# Patient Record
Sex: MALE | Race: WHITE | HISPANIC OR LATINO | Employment: UNEMPLOYED | URBAN - METROPOLITAN AREA
[De-identification: names, ages, dates, MRNs, and addresses within clinical notes are randomized per-mention and may not be internally consistent; named-entity substitution may affect disease eponyms.]

---

## 2019-11-11 ENCOUNTER — HOSPITAL ENCOUNTER (INPATIENT)
Facility: HOSPITAL | Age: 29
LOS: 1 days | Discharge: HOME | DRG: 918 | End: 2019-11-12
Attending: SURGERY | Admitting: FAMILY MEDICINE

## 2019-11-11 DIAGNOSIS — T59.811A: ICD-10-CM

## 2019-11-11 DIAGNOSIS — R06.02 SOB (SHORTNESS OF BREATH): ICD-10-CM

## 2019-11-11 DIAGNOSIS — R55: ICD-10-CM
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
## 2019-11-11 PROBLEM — T58.92XA: Status: ACTIVE | Noted: 2019-11-11

## 2019-11-11 PROBLEM — T58.92XA: Status: RESOLVED | Noted: 2019-11-11 | Resolved: 2019-11-11

## 2019-11-11 PROBLEM — G93.1 HYPOXIC ENCEPHALOPATHY: Status: ACTIVE | Noted: 2019-11-11

## 2019-11-11 PROBLEM — T58.91XA TOXIC EFFECT OF CARBON MONOXIDE, UNINTENTIONAL: Status: ACTIVE | Noted: 2019-11-11

## 2019-11-11 LAB
ALBUMIN SERPL BCP-MCNC: 4.4 G/DL (ref 3.5–5.2)
ALLENS TEST: ABNORMAL
ALP SERPL-CCNC: 97 U/L (ref 55–135)
ALT SERPL W/O P-5'-P-CCNC: 5 U/L (ref 10–44)
AMPHET+METHAMPHET UR QL: NEGATIVE
ANION GAP SERPL CALC-SCNC: 17 MMOL/L (ref 8–16)
AST SERPL-CCNC: 16 U/L (ref 10–40)
BACTERIA #/AREA URNS HPF: ABNORMAL /HPF
BARBITURATES UR QL SCN>200 NG/ML: NEGATIVE
BASOPHILS # BLD AUTO: 0.04 K/UL (ref 0–0.2)
BASOPHILS NFR BLD: 0.2 % (ref 0–1.9)
BENZODIAZ UR QL SCN>200 NG/ML: NEGATIVE
BILIRUB SERPL-MCNC: 0.4 MG/DL (ref 0.1–1)
BILIRUB UR QL STRIP: NEGATIVE
BNP SERPL-MCNC: <10 PG/ML (ref 0–99)
BUN SERPL-MCNC: 15 MG/DL (ref 6–20)
BZE UR QL SCN: NEGATIVE
CALCIUM SERPL-MCNC: 9.5 MG/DL (ref 8.7–10.5)
CANNABINOIDS UR QL SCN: NORMAL
CHLORIDE SERPL-SCNC: 104 MMOL/L (ref 95–110)
CK MB SERPL-MCNC: 1.5 NG/ML (ref 0.1–6.5)
CK MB SERPL-RTO: 1.4 % (ref 0–5)
CK SERPL-CCNC: 105 U/L (ref 20–200)
CK SERPL-CCNC: 105 U/L (ref 20–200)
CLARITY UR: CLEAR
CO2 SERPL-SCNC: 20 MMOL/L (ref 23–29)
COLOR UR: YELLOW
CREAT SERPL-MCNC: 1.3 MG/DL (ref 0.5–1.4)
CREAT UR-MCNC: 175.9 MG/DL (ref 23–375)
DELSYS: ABNORMAL
DIFFERENTIAL METHOD: ABNORMAL
EOSINOPHIL # BLD AUTO: 0 K/UL (ref 0–0.5)
EOSINOPHIL NFR BLD: 0.1 % (ref 0–8)
ERYTHROCYTE [DISTWIDTH] IN BLOOD BY AUTOMATED COUNT: 13.5 % (ref 11.5–14.5)
EST. GFR  (AFRICAN AMERICAN): >60 ML/MIN/1.73 M^2
EST. GFR  (NON AFRICAN AMERICAN): >60 ML/MIN/1.73 M^2
GLUCOSE SERPL-MCNC: 159 MG/DL (ref 70–110)
GLUCOSE UR QL STRIP: NEGATIVE
HCO3 UR-SCNC: 17 MMOL/L (ref 22–26)
HCT VFR BLD AUTO: 51.6 % (ref 40–54)
HGB BLD-MCNC: 17.1 G/DL (ref 14–18)
HGB UR QL STRIP: NEGATIVE
HYALINE CASTS #/AREA URNS LPF: 5 /LPF
IMM GRANULOCYTES # BLD AUTO: 0.08 K/UL (ref 0–0.04)
IMM GRANULOCYTES NFR BLD AUTO: 0.5 % (ref 0–0.5)
KETONES UR QL STRIP: ABNORMAL
LEUKOCYTE ESTERASE UR QL STRIP: NEGATIVE
LYMPHOCYTES # BLD AUTO: 1.1 K/UL (ref 1–4.8)
LYMPHOCYTES NFR BLD: 6.8 % (ref 18–48)
MCH RBC QN AUTO: 27.4 PG (ref 27–31)
MCHC RBC AUTO-ENTMCNC: 33.1 G/DL (ref 32–36)
MCV RBC AUTO: 83 FL (ref 82–98)
METHADONE UR QL SCN>300 NG/ML: NEGATIVE
MICROSCOPIC COMMENT: ABNORMAL
MONOCYTES # BLD AUTO: 0.9 K/UL (ref 0.3–1)
MONOCYTES NFR BLD: 5.4 % (ref 4–15)
NEUTROPHILS # BLD AUTO: 14.3 K/UL (ref 1.8–7.7)
NEUTROPHILS NFR BLD: 87 % (ref 38–73)
NITRITE UR QL STRIP: NEGATIVE
NRBC BLD-RTO: 0 /100 WBC
OPIATES UR QL SCN: NEGATIVE
OTHER ELEMENTS URNS MICRO: ABNORMAL
PCO2 BLDA: 33 MMHG (ref 35–45)
PCP UR QL SCN>25 NG/ML: NEGATIVE
PH SMN: 7.32 [PH] (ref 7.35–7.45)
PH UR STRIP: 6 [PH] (ref 5–8)
PLATELET # BLD AUTO: 297 K/UL (ref 150–350)
PMV BLD AUTO: 9 FL (ref 9.2–12.9)
PO2 BLDA: 379 MMHG (ref 75–100)
POC BE: -7.9 MMOL/L (ref -2–2)
POC COHB: 34.4 % (ref 0–3)
POC METHB: 0.4 % (ref 0–1.5)
POC O2HB ARTERIAL: 63.9 % (ref 94–100)
POC SATURATED O2: 98.2 % (ref 90–100)
POC TCO2: 18 MMOL/L
POC THB: 16.9 G/DL (ref 12–18)
POTASSIUM SERPL-SCNC: 4.5 MMOL/L (ref 3.5–5.1)
PROT SERPL-MCNC: 7.7 G/DL (ref 6–8.4)
PROT UR QL STRIP: ABNORMAL
RBC # BLD AUTO: 6.23 M/UL (ref 4.6–6.2)
RBC #/AREA URNS HPF: 4 /HPF (ref 0–4)
SITE: ABNORMAL
SODIUM SERPL-SCNC: 141 MMOL/L (ref 136–145)
SP GR UR STRIP: 1.02 (ref 1–1.03)
TOXICOLOGY INFORMATION: NORMAL
TROPONIN I SERPL DL<=0.01 NG/ML-MCNC: 0.03 NG/ML (ref 0–0.03)
URN SPEC COLLECT METH UR: ABNORMAL
UROBILINOGEN UR STRIP-ACNC: ABNORMAL EU/DL
WBC # BLD AUTO: 16.42 K/UL (ref 3.9–12.7)
WBC #/AREA URNS HPF: 3 /HPF (ref 0–5)

## 2019-11-11 PROCEDURE — 80053 COMPREHEN METABOLIC PANEL: CPT

## 2019-11-11 PROCEDURE — 80307 DRUG TEST PRSMV CHEM ANLYZR: CPT

## 2019-11-11 PROCEDURE — 94761 N-INVAS EAR/PLS OXIMETRY MLT: CPT

## 2019-11-11 PROCEDURE — 82550 ASSAY OF CK (CPK): CPT

## 2019-11-11 PROCEDURE — 93005 ELECTROCARDIOGRAM TRACING: CPT

## 2019-11-11 PROCEDURE — 25000242 PHARM REV CODE 250 ALT 637 W/ HCPCS: Performed by: SURGERY

## 2019-11-11 PROCEDURE — 82803 BLOOD GASES ANY COMBINATION: CPT | Performed by: SURGERY

## 2019-11-11 PROCEDURE — 81000 URINALYSIS NONAUTO W/SCOPE: CPT | Mod: 59

## 2019-11-11 PROCEDURE — 94640 AIRWAY INHALATION TREATMENT: CPT

## 2019-11-11 PROCEDURE — 36600 WITHDRAWAL OF ARTERIAL BLOOD: CPT

## 2019-11-11 PROCEDURE — 63600175 PHARM REV CODE 636 W HCPCS: Performed by: SURGERY

## 2019-11-11 PROCEDURE — 93010 EKG 12-LEAD: ICD-10-PCS | Mod: ,,, | Performed by: INTERNAL MEDICINE

## 2019-11-11 PROCEDURE — 99222 1ST HOSP IP/OBS MODERATE 55: CPT | Mod: ,,, | Performed by: FAMILY MEDICINE

## 2019-11-11 PROCEDURE — 11000001 HC ACUTE MED/SURG PRIVATE ROOM

## 2019-11-11 PROCEDURE — 99222 PR INITIAL HOSPITAL CARE,LEVL II: ICD-10-PCS | Mod: ,,, | Performed by: FAMILY MEDICINE

## 2019-11-11 PROCEDURE — 27000221 HC OXYGEN, UP TO 24 HOURS

## 2019-11-11 PROCEDURE — 99900035 HC TECH TIME PER 15 MIN (STAT)

## 2019-11-11 PROCEDURE — 96360 HYDRATION IV INFUSION INIT: CPT

## 2019-11-11 PROCEDURE — 83880 ASSAY OF NATRIURETIC PEPTIDE: CPT

## 2019-11-11 PROCEDURE — 85025 COMPLETE CBC W/AUTO DIFF WBC: CPT

## 2019-11-11 PROCEDURE — 82553 CREATINE MB FRACTION: CPT

## 2019-11-11 PROCEDURE — 99285 EMERGENCY DEPT VISIT HI MDM: CPT | Mod: 25

## 2019-11-11 PROCEDURE — 84484 ASSAY OF TROPONIN QUANT: CPT

## 2019-11-11 PROCEDURE — 93010 ELECTROCARDIOGRAM REPORT: CPT | Mod: ,,, | Performed by: INTERNAL MEDICINE

## 2019-11-11 PROCEDURE — 36415 COLL VENOUS BLD VENIPUNCTURE: CPT

## 2019-11-11 RX ORDER — SODIUM CHLORIDE 0.9 % (FLUSH) 0.9 %
10 SYRINGE (ML) INJECTION
Status: DISCONTINUED | OUTPATIENT
Start: 2019-11-11 | End: 2019-11-12 | Stop reason: HOSPADM

## 2019-11-11 RX ORDER — PANTOPRAZOLE SODIUM 40 MG/1
40 TABLET, DELAYED RELEASE ORAL DAILY
Status: DISCONTINUED | OUTPATIENT
Start: 2019-11-12 | End: 2019-11-12 | Stop reason: HOSPADM

## 2019-11-11 RX ORDER — LEVALBUTEROL 1.25 MG/.5ML
1.25 SOLUTION, CONCENTRATE RESPIRATORY (INHALATION) EVERY 6 HOURS PRN
Status: DISCONTINUED | OUTPATIENT
Start: 2019-11-11 | End: 2019-11-12 | Stop reason: HOSPADM

## 2019-11-11 RX ORDER — IPRATROPIUM BROMIDE AND ALBUTEROL SULFATE 2.5; .5 MG/3ML; MG/3ML
3 SOLUTION RESPIRATORY (INHALATION)
Status: COMPLETED | OUTPATIENT
Start: 2019-11-11 | End: 2019-11-11

## 2019-11-11 RX ORDER — SODIUM CHLORIDE 9 MG/ML
1000 INJECTION, SOLUTION INTRAVENOUS
Status: COMPLETED | OUTPATIENT
Start: 2019-11-11 | End: 2019-11-11

## 2019-11-11 RX ORDER — ACETAMINOPHEN 325 MG/1
650 TABLET ORAL EVERY 8 HOURS PRN
Status: DISCONTINUED | OUTPATIENT
Start: 2019-11-11 | End: 2019-11-12 | Stop reason: HOSPADM

## 2019-11-11 RX ORDER — ONDANSETRON 2 MG/ML
4 INJECTION INTRAMUSCULAR; INTRAVENOUS EVERY 8 HOURS PRN
Status: DISCONTINUED | OUTPATIENT
Start: 2019-11-11 | End: 2019-11-12 | Stop reason: HOSPADM

## 2019-11-11 RX ADMIN — SODIUM CHLORIDE 1000 ML: 0.9 INJECTION, SOLUTION INTRAVENOUS at 06:11

## 2019-11-11 RX ADMIN — IPRATROPIUM BROMIDE AND ALBUTEROL SULFATE 3 ML: .5; 3 SOLUTION RESPIRATORY (INHALATION) at 05:11
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
## 2019-11-12 VITALS
WEIGHT: 145 LBS | OXYGEN SATURATION: 100 % | DIASTOLIC BLOOD PRESSURE: 68 MMHG | BODY MASS INDEX: 23.4 KG/M2 | RESPIRATION RATE: 18 BRPM | TEMPERATURE: 98 F | HEART RATE: 76 BPM | SYSTOLIC BLOOD PRESSURE: 103 MMHG

## 2019-11-12 LAB
ALBUMIN SERPL BCP-MCNC: 3.6 G/DL (ref 3.5–5.2)
ALLENS TEST: ABNORMAL
ALP SERPL-CCNC: 77 U/L (ref 55–135)
ALT SERPL W/O P-5'-P-CCNC: <5 U/L (ref 10–44)
ANION GAP SERPL CALC-SCNC: 8 MMOL/L (ref 8–16)
AST SERPL-CCNC: 14 U/L (ref 10–40)
BASOPHILS # BLD AUTO: 0.04 K/UL (ref 0–0.2)
BASOPHILS NFR BLD: 0.5 % (ref 0–1.9)
BILIRUB SERPL-MCNC: 0.5 MG/DL (ref 0.1–1)
BUN SERPL-MCNC: 14 MG/DL (ref 6–20)
CALCIUM SERPL-MCNC: 8.6 MG/DL (ref 8.7–10.5)
CHLORIDE SERPL-SCNC: 106 MMOL/L (ref 95–110)
CO2 SERPL-SCNC: 26 MMOL/L (ref 23–29)
CREAT SERPL-MCNC: 0.8 MG/DL (ref 0.5–1.4)
DELSYS: ABNORMAL
DIFFERENTIAL METHOD: ABNORMAL
EOSINOPHIL # BLD AUTO: 0.1 K/UL (ref 0–0.5)
EOSINOPHIL NFR BLD: 0.8 % (ref 0–8)
ERYTHROCYTE [DISTWIDTH] IN BLOOD BY AUTOMATED COUNT: 13.4 % (ref 11.5–14.5)
EST. GFR  (AFRICAN AMERICAN): >60 ML/MIN/1.73 M^2
EST. GFR  (NON AFRICAN AMERICAN): >60 ML/MIN/1.73 M^2
GLUCOSE SERPL-MCNC: 81 MG/DL (ref 70–110)
HCO3 UR-SCNC: 26.6 MMOL/L (ref 22–26)
HCT VFR BLD AUTO: 43.5 % (ref 40–54)
HGB BLD-MCNC: 14.1 G/DL (ref 14–18)
IMM GRANULOCYTES # BLD AUTO: 0.02 K/UL (ref 0–0.04)
IMM GRANULOCYTES NFR BLD AUTO: 0.3 % (ref 0–0.5)
LYMPHOCYTES # BLD AUTO: 2.4 K/UL (ref 1–4.8)
LYMPHOCYTES NFR BLD: 32.5 % (ref 18–48)
MCH RBC QN AUTO: 26.7 PG (ref 27–31)
MCHC RBC AUTO-ENTMCNC: 32.4 G/DL (ref 32–36)
MCV RBC AUTO: 82 FL (ref 82–98)
MONOCYTES # BLD AUTO: 0.7 K/UL (ref 0.3–1)
MONOCYTES NFR BLD: 9.2 % (ref 4–15)
NEUTROPHILS # BLD AUTO: 4.1 K/UL (ref 1.8–7.7)
NEUTROPHILS NFR BLD: 56.7 % (ref 38–73)
NRBC BLD-RTO: 0 /100 WBC
PCO2 BLDA: 42 MMHG (ref 35–45)
PH SMN: 7.41 [PH] (ref 7.35–7.45)
PLATELET # BLD AUTO: 225 K/UL (ref 150–350)
PMV BLD AUTO: 9.5 FL (ref 9.2–12.9)
PO2 BLDA: 516 MMHG (ref 75–100)
POC BE: 1.7 MMOL/L (ref -2–2)
POC COHB: 0.9 % (ref 0–3)
POC METHB: 1.6 % (ref 0–1.5)
POC O2HB ARTERIAL: 96.2 % (ref 94–100)
POC SATURATED O2: 98.7 % (ref 90–100)
POC TCO2: 27.9 MMOL/L
POC THB: 14.7 G/DL (ref 12–18)
POTASSIUM SERPL-SCNC: 3.5 MMOL/L (ref 3.5–5.1)
PROT SERPL-MCNC: 6.3 G/DL (ref 6–8.4)
RBC # BLD AUTO: 5.29 M/UL (ref 4.6–6.2)
SITE: ABNORMAL
SODIUM SERPL-SCNC: 140 MMOL/L (ref 136–145)
WBC # BLD AUTO: 7.3 K/UL (ref 3.9–12.7)

## 2019-11-12 PROCEDURE — 27100171 HC OXYGEN HIGH FLOW UP TO 24 HOURS

## 2019-11-12 PROCEDURE — 94761 N-INVAS EAR/PLS OXIMETRY MLT: CPT

## 2019-11-12 PROCEDURE — 99232 SBSQ HOSP IP/OBS MODERATE 35: CPT | Mod: ,,, | Performed by: INTERNAL MEDICINE

## 2019-11-12 PROCEDURE — 82803 BLOOD GASES ANY COMBINATION: CPT | Performed by: INTERNAL MEDICINE

## 2019-11-12 PROCEDURE — 36415 COLL VENOUS BLD VENIPUNCTURE: CPT

## 2019-11-12 PROCEDURE — 27000221 HC OXYGEN, UP TO 24 HOURS

## 2019-11-12 PROCEDURE — 99900035 HC TECH TIME PER 15 MIN (STAT)

## 2019-11-12 PROCEDURE — 36600 WITHDRAWAL OF ARTERIAL BLOOD: CPT

## 2019-11-12 PROCEDURE — 99232 PR SUBSEQUENT HOSPITAL CARE,LEVL II: ICD-10-PCS | Mod: ,,, | Performed by: INTERNAL MEDICINE

## 2019-11-12 PROCEDURE — 85025 COMPLETE CBC W/AUTO DIFF WBC: CPT

## 2019-11-12 PROCEDURE — 80053 COMPREHEN METABOLIC PANEL: CPT

## 2019-11-12 NOTE — ASSESSMENT & PLAN NOTE
Treated with 100% O2 overnight  Repeat ABG shows resolution  Discussed No more generator in home

## 2019-11-12 NOTE — PLAN OF CARE
11/12/19 1131   Discharge Assessment   Assessment Type Discharge Planning Assessment   Confirmed/corrected address and phone number on facesheet? No   Assessment information obtained from? Patient   Prior to hospitilization cognitive status: Alert/Oriented   Prior to hospitalization functional status: Independent   Current cognitive status: Alert/Oriented   Current Functional Status: Independent   Facility Arrived From: Home   Lives With alone   Able to Return to Prior Arrangements yes   Is patient able to care for self after discharge? Yes   Who are your caregiver(s) and their phone number(s)? Austen (Brother) [phone]   Patient's perception of discharge disposition home or selfcare   Readmission Within the Last 30 Days no previous admission in last 30 days   Patient currently being followed by outpatient case management? No   Patient currently receives any other outside agency services? No   Equipment Currently Used at Home none   Does the patient have transportation home? Yes   Transportation Anticipated family or friend will provide  (Brother will provide. )   Discharge Plan A Home   Discharge Plan B Home with family   DME Needed Upon Discharge  none   Patient/Family in Agreement with Plan yes       No post-acute care needs identified at this time. SW to continue to monitor needs throughout hospital stay.     Twyla Miles, MARTHA
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
## 2019-11-12 NOTE — PLAN OF CARE
11/12/19 1136   Post-Acute Status   Post-Acute Authorization Other   Other Status No Post-Acute Service Needs   Discharge Delays None known at this time

## 2019-11-12 NOTE — PROGRESS NOTES
Staff Handoff  Patient transferred from ED via wheelchair with continuous oxygen via a nonrebreather mask. Transported by CONNIE Valerio. Patient speaks broken english, will use sergo for assessment and admit questions. No complaints of pain. No s/s   of respiratory distress. Asked to call for assistance. Bed alarm on. Call light within reach.       Resident Handoff

## 2019-11-12 NOTE — PLAN OF CARE
11/12/19 1135   Final Note   Assessment Type Final Discharge Note   Anticipated Discharge Disposition Home   What phone number can be called within the next 1-3 days to see how you are doing after discharge? [phone]   Hospital Follow Up  Appt(s) scheduled? Yes   Discharge plans and expectations educations in teach back method with documentation complete? Yes         No post-acute care needs identified during this hospital stay.     Twyla Miles LMSW
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
## 2019-11-12 NOTE — DISCHARGE INSTRUCTIONS
Carbon Monoxide Poisoning     Oxygen is given through a facemask or in a special oxygen chamber.   Carbon monoxide (CO) is a deadly gas you can't see or smell. It's produced when certain fuels are burned. Faulty stoves or furnaces can release carbon monoxide. So can a car left running in an enclosed space. Inhaling even small amounts of carbon monoxide can make you sick. Larger amounts can be fatal.  When to go to the emergency room (ER)  Carbon monoxide (CO) poisoning is a medical emergency. Get the victim into fresh air right away. Then call 911 or go to the nearest hospital. It`s easy to mistake mild carbon monoxide poisoning for the flu. If more than one person is having the same symptoms, or symptoms occur at the same location (such as home, or work) it should raise concern for CO poisoning. Watch for symptoms such as:  · Headache and dizziness  · Weakness  · Confusion or loss of memory  · Nausea and vomiting  · Trouble breathing or chest pain  · Irregular, skipped, or fast heartbeats  What to expect in the ER  · Oxygen therapy is the main treatment for carbon monoxide poisoning. The victim is likely to receive oxygen through a facemask right away.  · The victim may be placed on a heart monitor with a pulse oximeter. This displays the heart rhythm and the amount of oxygen in the blood. An electrocardiogram may be done to check for damage to the heart.    · Blood tests to check carbon monoxide and oxygen levels and lung function may be done.  · In certain cases, the victim might be transferred to a hyperbaric oxygen center for treatment in a special oxygen chamber.   · An imaging test such as computed tomography (CT) or magnetic resonance imaging (MRI) may be done to check for damage to the brain. Depending on the test results, the victim may be admitted to the hospital.  To help prevent carbon monoxide poisoning  · Have furnaces, water heaters, gas ovens, and wood stoves checked each year.  · Don't use a  charcoal grill inside the house.  · Don't leave your car running in an enclosed space.  · Install a carbon monoxide detector that meets [employer] (UL) standards.   Date Last Reviewed: 7/15/2015  © 4292-6069 [employer]. [address] [place] [address], [place] [address]. All rights reserved. This information is not intended as a substitute for professional medical care. Always follow your healthcare professional's instructions.        Envenenamiento por monóxido de carbono     El oxígeno se administra a través de amita máscara o en amita cámara de oxígeno especial.   El monóxido de carbono es un gas mortal que no se puede carlos ni oler. Se produce cuando se queman ciertos combustibles. Las estufas u hornos defectuosos pueden liberar monóxido de carbono. Lo mismo sucede si se kathy un automóvil en marcha en un espacio cerrado. Inhalar incluso cantidades pequeñas de monóxido de carbono puede hacer que se sienta mal. Y las cantidades grandes pueden ser fatales.  ¿Cuándo debe acudir a la emiliana de emergencias (ER, por ace siglas en inglés)?  El envenenamiento por monóxido de carbono es amita emergencia médica. Lleve a la víctima hacia el aire john de inmediato. Luego, llame al 911 o vaya al hospital más [facility]. Es muy fácil confundir el envenenamiento por monóxido de carbono con amita gripe. Preste atención a síntomas tales rishabh:  · Dolor de lai o mareos  · Debilidad  · Confusión o pérdida de la memoria  · Náuseas y vómito  · Dificultad para respirar o dolor en el pecho  · Latidos irregulares o rápidos, o el corazón se saltea latidos  Para evitar el envenenamiento por monóxido de carbono  · Renee revisar hornos, hornallas, calentadores de agua, estufas de lindsey y de gas todos los años.  · No use el parrillero dentro de la casa.  · No deje vo automóvil en marcha en un espacio cerrado.  · Coloque un detector de monóxido de carbono que cumpla las normas de [employer] (UL).   ¿Qué puede  esperar en la ER?  · La oxigenoterapia es el mejor tratamiento para el envenenamiento por monóxido de carbono. Es probable que a la víctima le administren oxígeno de inmediato a través de amita máscara facial.  · Puede que la víctima sea colocada en un monitor cardíaco con un oxímetro de pulso. [place] permite carlos el ritmo cardíaco y la cantidad de oxígeno en la jesse. Quizás le phyllis un electrocardiograma para carlos si hay daños en el corazón.    · Es posible que le phyllis análisis de jesse para carlos los niveles de oxígeno y el funcionamiento de los pulmones.  · Puede que la víctima sea trasladada a un centro de oxígeno hiperbárico para recibir tratamiento en amita cámara de oxígeno especial.   · También puede que le phyllis amita prueba de diagnóstico por imágenes llamada tomografía computarizada (CT, por ace siglas en inglés) o amita resonancia magnética (MRI, por ace siglas en inglés) para carlos si hay daños en el cerebro. Según los resultados de las pruebas, la víctima quizás deba ser admitida en el hospital.  Date Last Reviewed: 7/15/2015  © 3080-3594 [employer]. [address] [place] [address], [place] [address]. Todos los derechos reservados. Esta información no pretende sustituir la atención médica profesional. Sólo vo médico puede diagnosticar y tratar un problema de navi.
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
## 2019-11-12 NOTE — SUBJECTIVE & OBJECTIVE
Review of Systems   Constitutional: Negative for activity change and appetite change.   HENT: Negative for congestion, ear pain, sneezing and sore throat.    Eyes: Negative for redness and visual disturbance.   Respiratory: Negative for cough, chest tightness and stridor.    Cardiovascular: Negative for chest pain.   Gastrointestinal: Negative for abdominal pain, blood in stool, diarrhea, nausea and vomiting.   Genitourinary: Negative for difficulty urinating, dysuria and hematuria.   Musculoskeletal: Negative for arthralgias, back pain, joint swelling, myalgias and neck pain.   Skin: Negative for rash.   Neurological: Negative for dizziness, weakness and headaches.   Psychiatric/Behavioral: Negative for agitation. The patient is not nervous/anxious.      Objective:     Vital Signs (Most Recent):  Temp: 96.2 °F (35.7 °C) (11/12/19 0722)  Pulse: 64 (11/12/19 1000)  Resp: 18 (11/12/19 0722)  BP: (!) 102/54 (11/12/19 0722)  SpO2: 100 % (11/12/19 0722) Vital Signs (24h Range):  Temp:  [96.1 °F (35.6 °C)-97 °F (36.1 °C)] 96.2 °F (35.7 °C)  Pulse:  [] 64  Resp:  [17-25] 18  SpO2:  [99 %-100 %] 100 %  BP: (102-121)/(54-69) 102/54     Weight: 65.8 kg (145 lb)  Body mass index is 23.4 kg/m².    Physical Exam   Constitutional: He is oriented to person, place, and time. He appears well-developed and well-nourished.   HENT:   Head: Normocephalic and atraumatic.   Right Ear: External ear normal.   Left Ear: External ear normal.   Eyes: Pupils are equal, round, and reactive to light. Conjunctivae and EOM are normal.   Neck: Normal range of motion. Neck supple. No tracheal deviation present.   Cardiovascular: Normal rate and regular rhythm.   No murmur heard.  Pulmonary/Chest: Effort normal. No respiratory distress. He has no wheezes.   Abdominal: Soft. Bowel sounds are normal. He exhibits no distension. There is no tenderness.   Musculoskeletal: Normal range of motion. He exhibits no edema or tenderness.   Neurological: He  is alert and oriented to person, place, and time. He has normal reflexes. No cranial nerve deficit.   Skin: Skin is warm and dry.   Psychiatric: He has a normal mood and affect. Judgment and thought content normal.   Nursing note and vitals reviewed.        CRANIAL NERVES     CN III, IV, VI   Pupils are equal, round, and reactive to light.  Extraocular motions are normal.        Significant Labs:   ABGs:   Recent Labs   Lab 11/12/19  0637   PH 7.41   PCO2 42   HCO3 26.60*   POCSATURATED 98.7   BE 1.70   TOTALHB 14.7   COHB 0.9   METHB 1.6*     CBC:   Recent Labs   Lab 11/11/19 1739 11/12/19  0533   WBC 16.42* 7.30   HGB 17.1 14.1   HCT 51.6 43.5    225     CMP:   Recent Labs   Lab 11/11/19 1739 11/12/19  0532    140   K 4.5 3.5    106   CO2 20* 26   * 81   BUN 15 14   CREATININE 1.3 0.8   CALCIUM 9.5 8.6*   PROT 7.7 6.3   ALBUMIN 4.4 3.6   BILITOT 0.4 0.5   ALKPHOS 97 77   AST 16 14   ALT 5* <5*   ANIONGAP 17* 8   EGFRNONAA >60 >60   UDS + THC  Urinalysis  Recent Labs   Lab 11/11/19  2300   COLORU Yellow   SPECGRAV 1.025   PHUR 6.0   PROTEINUA 1+*   BACTERIA Few*   NITRITE Negative   LEUKOCYTESUR Negative   UROBILINOGEN 2.0-3.0*   HYALINECASTS 5*     Recent Labs   Lab 11/11/19  1739     105   CPKMB 1.5   TROPONINI 0.034*   MB 1.4     BNP  Recent Labs   Lab 11/11/19  1739   BNP <10       Significant Imaging:     CXR No acute process seen.    CT head Decreased attenuation is seen within the basal ganglia with areas of high attenuation which could reflect calcification.  Findings can be seen in the setting of carbon monoxide poisoning.  Correlation is recommended.  MRI can be obtained as clinically warranted.  Findings were discussed with Dr. Forde at 18:06 at 11/11/2019.    EKG NSR
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
## 2019-11-12 NOTE — PLAN OF CARE
11/12/19 1142   Discharge Reassessment   Assessment Type Discharge Planning Assessment   Provided patient/caregiver education on the expected discharge date and the discharge plan Yes   Do you have any problems affording any of your prescribed medications? No   Patient choice form signed by patient/caregiver N/A   Anticipated Discharge Disposition Home   How does the patient rate their overall health at the present time? Fair       Patient admitted due to CO poisoning. Patient educated on diagnosis for this admission, and patient verbalizes understanding. Discharge planning brochure and educational handout of what signs and symptoms to look for after discharge, and how to manage health at home, given to patient and family. Patient and family are encouraged to call with any questions or help the would need. Contact information given. CM will continue to follow patient throughout the transitions of care, and assist with any discharge needs.
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
## 2019-11-12 NOTE — SUBJECTIVE & OBJECTIVE
Interval History: Feels better     Objective:     Vital Signs (Most Recent):  Temp: 96.3 °F (35.7 °C) (11/12/19 0450)  Pulse: (!) 56 (11/12/19 0600)  Resp: 17 (11/12/19 0450)  BP: (!) 105/58 (11/12/19 0450)  SpO2: 100 % (11/12/19 0450) Vital Signs (24h Range):  Temp:  [96.1 °F (35.6 °C)-97 °F (36.1 °C)] 96.3 °F (35.7 °C)  Pulse:  [] 56  Resp:  [17-25] 17  SpO2:  [99 %-100 %] 100 %  BP: (105-121)/(58-69) 105/58     Weight: 65.8 kg (145 lb)  Body mass index is 23.4 kg/m².      Intake/Output Summary (Last 24 hours) at 11/12/2019 0715  Last data filed at 11/11/2019 2309  Gross per 24 hour   Intake 1000 ml   Output 400 ml   Net 600 ml       Physical Exam   Constitutional: He is oriented to person, place, and time. He appears well-developed and well-nourished. He is cooperative.  Non-toxic appearance. He does not appear ill. No distress.   HENT:   Head: Normocephalic and atraumatic.   Right Ear: Hearing, tympanic membrane, external ear and ear canal normal. Tympanic membrane is not injected and not erythematous.   Left Ear: Hearing, tympanic membrane, external ear and ear canal normal. Tympanic membrane is not injected and not erythematous.   Nose: Nose normal. No mucosal edema, rhinorrhea or nasal deformity. No epistaxis. Right sinus exhibits no maxillary sinus tenderness and no frontal sinus tenderness. Left sinus exhibits no maxillary sinus tenderness and no frontal sinus tenderness.   Mouth/Throat: Uvula is midline, oropharynx is clear and moist and mucous membranes are normal. No trismus in the jaw. Normal dentition. No uvula swelling. No posterior oropharyngeal erythema.   Eyes: Conjunctivae and lids are normal. No scleral icterus.   Sclera clear bilat   Neck: Trachea normal, full passive range of motion without pain and phonation normal. Neck supple.   Cardiovascular: Normal rate, regular rhythm, S1 normal, S2 normal, normal heart sounds, intact distal pulses and normal pulses.   No murmur  heard.  Pulmonary/Chest: No accessory muscle usage. No respiratory distress (mild to moderate). He has no decreased breath sounds. He has no wheezes. He has no rhonchi. He has no rales.   Abdominal: Soft. Normal appearance and bowel sounds are normal. He exhibits no distension. There is no tenderness.   Musculoskeletal: Normal range of motion. He exhibits no edema or deformity.   Neurological: He is alert and oriented to person, place, and time. He exhibits normal muscle tone. Coordination normal.   Skin: Skin is warm, dry and intact. He is not diaphoretic. No pallor.   Psychiatric: He has a normal mood and affect. His speech is normal and behavior is normal. Judgment and thought content normal. Cognition and memory are normal.   Nursing note and vitals reviewed.      Vents:       Lines/Drains/Airways     Peripheral Intravenous Line                 Peripheral IV - Single Lumen 11/11/19 1732 20 G Left Hand less than 1 day                Significant Labs:    CBC/Anemia Profile:  Recent Labs   Lab 11/11/19 1739 11/12/19  0533   WBC 16.42* 7.30   HGB 17.1 14.1   HCT 51.6 43.5    225   MCV 83 82   RDW 13.5 13.4        Chemistries:  Recent Labs   Lab 11/11/19  1739 11/12/19  0532    140   K 4.5 3.5    106   CO2 20* 26   BUN 15 14   CREATININE 1.3 0.8   CALCIUM 9.5 8.6*   ALBUMIN 4.4 3.6   PROT 7.7 6.3   BILITOT 0.4 0.5   ALKPHOS 97 77   ALT 5* <5*   AST 16 14       All pertinent labs within the past 24 hours have been reviewed.    Significant Imaging:  I have reviewed all pertinent imaging results/findings within the past 24 hours.

## 2019-11-12 NOTE — PROGRESS NOTES
Pt is discharged instructions and education done. No Scripts.  used for instructions. Patient refused vaccines

## 2019-11-12 NOTE — PLAN OF CARE
11/12/19 1143   Discharge Reassessment   Assessment Type Final Discharge Note         Patient will discharge home today. There are no post acute needs identified for discharge. Patient reminded about what signs and symptoms to look for when discharged, and educated that if any symptoms return, make a same day appointment with PCP or come back to the ED. Patient states understanding and agreement. Patient denies any other needs or concerns for discharge.
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
